# Patient Record
Sex: FEMALE | URBAN - METROPOLITAN AREA
[De-identification: names, ages, dates, MRNs, and addresses within clinical notes are randomized per-mention and may not be internally consistent; named-entity substitution may affect disease eponyms.]

---

## 2023-05-02 ENCOUNTER — HOSPITAL ENCOUNTER (INPATIENT)
Age: 28
LOS: 3 days | Discharge: HOME | End: 2023-05-05
Attending: PSYCHIATRY & NEUROLOGY | Admitting: PSYCHIATRY & NEUROLOGY
Payer: COMMERCIAL

## 2023-05-02 PROBLEM — F31.64 SEVERE MIXED BIPOLAR I DISORDER WITH PSYCHOTIC FEATURES (HCC): Status: ACTIVE | Noted: 2023-05-02

## 2023-05-02 PROBLEM — F32.A DEPRESSION WITH SUICIDAL IDEATION: Status: ACTIVE | Noted: 2023-05-02

## 2023-05-02 PROBLEM — R45.851 DEPRESSION WITH SUICIDAL IDEATION: Status: ACTIVE | Noted: 2023-05-02

## 2023-05-02 PROCEDURE — 6370000000 HC RX 637 (ALT 250 FOR IP)

## 2023-05-02 PROCEDURE — 6370000000 HC RX 637 (ALT 250 FOR IP): Performed by: PSYCHIATRY & NEUROLOGY

## 2023-05-02 PROCEDURE — 1240000000 HC EMOTIONAL WELLNESS R&B

## 2023-05-02 RX ORDER — TRAZODONE HYDROCHLORIDE 50 MG/1
50 TABLET ORAL NIGHTLY PRN
Status: DISCONTINUED | OUTPATIENT
Start: 2023-05-02 | End: 2023-05-05 | Stop reason: HOSPADM

## 2023-05-02 RX ORDER — ARIPIPRAZOLE 5 MG/1
5 TABLET ORAL DAILY
Status: DISCONTINUED | OUTPATIENT
Start: 2023-05-02 | End: 2023-05-02

## 2023-05-02 RX ORDER — POLYETHYLENE GLYCOL 3350 17 G/17G
17 POWDER, FOR SOLUTION ORAL DAILY PRN
Status: DISCONTINUED | OUTPATIENT
Start: 2023-05-02 | End: 2023-05-05 | Stop reason: HOSPADM

## 2023-05-02 RX ORDER — HYDROXYZINE 50 MG/1
50 TABLET, FILM COATED ORAL 3 TIMES DAILY PRN
Status: DISCONTINUED | OUTPATIENT
Start: 2023-05-02 | End: 2023-05-05 | Stop reason: HOSPADM

## 2023-05-02 RX ORDER — ARIPIPRAZOLE 10 MG/1
10 TABLET ORAL DAILY
Status: DISCONTINUED | OUTPATIENT
Start: 2023-05-03 | End: 2023-05-04

## 2023-05-02 RX ORDER — ESCITALOPRAM OXALATE 20 MG/1
20 TABLET ORAL DAILY
Status: ON HOLD | COMMUNITY
End: 2023-05-05 | Stop reason: HOSPADM

## 2023-05-02 RX ORDER — ACETAMINOPHEN 325 MG/1
650 TABLET ORAL EVERY 4 HOURS PRN
Status: DISCONTINUED | OUTPATIENT
Start: 2023-05-02 | End: 2023-05-05 | Stop reason: HOSPADM

## 2023-05-02 RX ORDER — IBUPROFEN 400 MG/1
400 TABLET ORAL EVERY 6 HOURS PRN
Status: DISCONTINUED | OUTPATIENT
Start: 2023-05-02 | End: 2023-05-05 | Stop reason: HOSPADM

## 2023-05-02 RX ORDER — NICOTINE 21 MG/24HR
1 PATCH, TRANSDERMAL 24 HOURS TRANSDERMAL DAILY
Status: DISCONTINUED | OUTPATIENT
Start: 2023-05-02 | End: 2023-05-04

## 2023-05-02 RX ORDER — MAGNESIUM HYDROXIDE/ALUMINUM HYDROXICE/SIMETHICONE 120; 1200; 1200 MG/30ML; MG/30ML; MG/30ML
30 SUSPENSION ORAL EVERY 6 HOURS PRN
Status: DISCONTINUED | OUTPATIENT
Start: 2023-05-02 | End: 2023-05-05 | Stop reason: HOSPADM

## 2023-05-02 RX ADMIN — HYDROXYZINE HYDROCHLORIDE 50 MG: 50 TABLET, FILM COATED ORAL at 03:26

## 2023-05-02 RX ADMIN — HYDROXYZINE HYDROCHLORIDE 50 MG: 50 TABLET, FILM COATED ORAL at 21:25

## 2023-05-02 RX ADMIN — ARIPIPRAZOLE 5 MG: 5 TABLET ORAL at 11:24

## 2023-05-02 RX ADMIN — TRAZODONE HYDROCHLORIDE 50 MG: 50 TABLET ORAL at 21:25

## 2023-05-02 ASSESSMENT — PATIENT HEALTH QUESTIONNAIRE - PHQ9: SUM OF ALL RESPONSES TO PHQ QUESTIONS 1-9: 9

## 2023-05-02 ASSESSMENT — LIFESTYLE VARIABLES
HOW MANY STANDARD DRINKS CONTAINING ALCOHOL DO YOU HAVE ON A TYPICAL DAY: PATIENT DOES NOT DRINK
HOW OFTEN DO YOU HAVE A DRINK CONTAINING ALCOHOL: NEVER

## 2023-05-02 ASSESSMENT — SLEEP AND FATIGUE QUESTIONNAIRES
AVERAGE NUMBER OF SLEEP HOURS: 4
DO YOU HAVE DIFFICULTY SLEEPING: YES
DO YOU USE A SLEEP AID: NO
SLEEP PATTERN: DIFFICULTY FALLING ASLEEP;DISTURBED/INTERRUPTED SLEEP;RESTLESSNESS

## 2023-05-02 NOTE — PLAN OF CARE
Problem: Anxiety  Goal: Will report anxiety at manageable levels  Description: INTERVENTIONS:  1. Administer medication as ordered  2. Teach and rehearse alternative coping skills  3. Provide emotional support with 1:1 interaction with staff  5/2/2023 1601 by Geneva Carbajal LPN  Outcome: Progressing   Reports anxiety that is at [facility] levels. Problem: Depression/Self Harm  Goal: Effect of psychiatric condition will be minimized and patient will be protected from self harm  Description: INTERVENTIONS:  1. Assess impact of patient's symptoms on level of functioning, self care needs and offer support as indicated  2. Assess patient/family knowledge of depression, impact on illness and need for teaching  3. Provide emotional support, presence and reassurance  4. Assess for possible suicidal thoughts or ideation. If patient expresses suicidal thoughts or statements do not leave alone, initiate Suicide Precautions, move to a room close to the nursing station and obtain sitter  5. Initiate consults as appropriate with Mental Health Professional, Spiritual Care, Psychosocial CNS, and consider a recommendation to the LIP for a Psychiatric Consultation  5/2/2023 1601 by Geneva Carbajal LPN  Outcome: Progressing   Miss Shyam Trujillo is seen in the dayroom affect is flat she reports depression with some anxiety. States issues with lose of brother and feeling like she never grieved properly. Started on Medication, which she is willing to take. Reports fair sleep and no issues with appetite.

## 2023-05-02 NOTE — GROUP NOTE
Group Therapy Note    Date: 5/2/2023    Group Start Time: 1330  Group End Time: 8201  Group Topic: Psychoeducation    [facility]    FRANCISCO J Esqueda    Psych-Ed/Relapse Prevention Group Note        Date: April 2, 2023 Start Time: 1:30pm  End Time:  210pm      Number of Participants in Group & Unit Census:  8/15    Topic: psycheducation group     Goal of Group:pt will demonstrate improved concentration and improved cognitive skills       Comments:     Patient did not participate in Psych-Ed/Relapse Prevention group, despite staff encouragement and explanation of benefits. Patient remain seclusive to self. Q15 minute safety checks maintained for patient safety and will continue to encourage patient to attend unit programming.               Signature:  Janes Rodríguez

## 2023-05-02 NOTE — BH NOTE
Patient given tobacco quitline number 83705235018 at this time, refusing to call at this time, states \" I just dont want to quit now\"- patient given information as to the dangers of long term tobacco use. Continue to reinforce the importance of tobacco cessation.

## 2023-05-02 NOTE — GROUP NOTE
Group Therapy Note    Date: 5/2/2023    Group Start Time: 1601  Group End Time: 4518  Group Topic: Cognitive Skills    [facility]    FRANCISCO J Napier        Group Therapy Note    Attendees: 10/16       Patient's Goal:  pt will demonstrate improved coping skills and improved decision making skills     Notes:   pt is pleasant and participated well     Status After Intervention:  Improved    Participation Level:  Active Listener and Interactive    Participation Quality: Appropriate, Sharing, and Supportive      Speech:  normal      Thought Process/Content: Logical      Affective Functioning: Congruent      Mood: euthymic      Level of consciousness:  Alert      Response to Learning: Able to verbalize current knowledge/experience, Able to verbalize/acknowledge new learning, and Progressing to goal      Endings: None Reported    Modes of Intervention: Support, Socialization, and Activity      Discipline Responsible: Psychoeducational Specialist      Signature:  Romie Moulton

## 2023-05-02 NOTE — H&P
Department of Psychiatry  Attending Physician Psychiatric Assessment     Reason for Admission to Psychiatric Unit:  Threat to self requiring 24 hour professional observation  Concerns about patient's safety in the community    CHIEF COMPLAINT: Depression with suicidal ideation and acute psychosis    History obtained from: Patient, electronic medical record          HISTORY OF PRESENT ILLNESS:    Lottie Dobbs is a 34 y.o. female who has a past medical history of depression, anxiety, borderline personality disorder. Patient presented to the ED with suicidal ideation and plan to overdose or drive her car into the water. Patient denies any previous inpatient psychiatric hospitalization. Patient reports stopping medication compliance 6 months ago when she lost her insurance. Patient states at that time she was following with [facility] psychiatric services. Patient states once her insurance restarted she never followed up with mental health services. Patient endorses history of being prescribed Effexor, Zoloft and Lexapro. Patient states she was taking Effexor most recently however it made her sleep disturbances worse. Patient states Zoloft increased suicidal ideation and Lexapro did not work. Patient reports all trials with antidepressants negatively impacted her sleep. Patient is suspected to have bipolar diagnosis, plan to treat with Abilify 5 mg daily and titrate to 10 mg daily as tolerated. When approached for interview patient states she has been feeling very depressed to the point of suicidal ideation. Patient states she has been depression her whole life however it became significantly worse in December 2021 when her brother committed suicide by hanging. Patient states she never received grief counseling. Patient endorses stressors of being lonely and feeling trapped which makes \"dark thoughts overpowering\".   Patient also states she has not been able to work recently because of this which
mg/dL    Est, Glom Filt Rate >60 >60 mL/min/1.73m2    Calcium 9.5 8.6 - 10.4 mg/dL    Sodium 142 135 - 144 mmol/L    Potassium 4.0 3.7 - 5.3 mmol/L    Chloride 104 98 - 107 mmol/L    CO2 27 20 - 31 mmol/L    Anion Gap 11 9 - 17 mmol/L    Alkaline Phosphatase 89 35 - 104 U/L    ALT 42 (H) 5 - 33 U/L    AST 23 <32 U/L    Total Bilirubin 0.5 0.3 - 1.2 mg/dL    Total Protein 7.6 6.4 - 8.3 g/dL    Albumin 4.3 3.5 - 5.2 g/dL   TSH w/reflex to FT4    Collection Time: 05/03/23  7:34 AM   Result Value Ref Range    TSH 1.20 0.30 - 5.00 uIU/mL       Imaging/Diagonstics:  Recent data reviewed    Assessment :      Primary Problem  Severe mixed bipolar I disorder with psychotic features [facility])    Active Hospital Problems    Diagnosis Date Noted    Severe mixed bipolar I disorder with psychotic features [facility]) [F31.64] 05/02/2023       Plan:     Reason for consult: General medical management     Depression with suicidal ideation-management per psychiatry  Morbid obesity BMI 42  Ordering routine labs, vital satisfactory. ALT elevated, will add hep C    DVT prophylaxis-not required, patient is mobile    Consultations:   Edna Guidry MD  5/2/2023  4:18 PM    Copy sent to No primary care provider on file. Please note that this chart was generated using voice recognition Dragon dictation software. Although every effort was made to ensure the accuracy of this automated transcription, some errors in transcription may have occurred.

## 2023-05-02 NOTE — BH NOTE
[facility]  Admission Note     Admission Type:   Admission Type: Voluntary    Reason for admission:  Reason for Admission: Patient went to [facility] ED with suicidal thoughts to over dose on pills or drive car into pond. Patient having difficulty coping with deaths in family and life stressors. Patient states that her depression has worsened, she is not sleeping or eating and very anxious. Addictive Behavior:   Addictive Behavior  In the Past 3 Months, Have You Felt or Has Someone Told You That You Have a Problem With  : None    Medical Problems:   History reviewed. No pertinent past medical history. Status EXAM:  Mental Status and Behavioral Exam  Normal: No  Level of Assistance: Independent/Self  Facial Expression: Sad  Affect: Blunt  Level of Consciousness: Alert  Frequency of Checks: 4 times per hour, close  Mood:Normal: No  Mood: Depressed, Anxious  Motor Activity:Normal: No  Motor Activity: Unusual posture/gait  Eye Contact: Fair  Observed Behavior: Cooperative, Preoccupied  Sexual Misconduct History: Current - no  Preception: [place] to person, [place] to time, [place] to place, [place] to situation  Attention:Normal: No  Attention: Unable to concentrate  Thought Processes: Other (comment) (cooperative, linear)  Thought Content:Normal: No  Thought Content: Poverty of content  Depression Symptoms: Feelings of helplessness, Feelings of hopelessess  Anxiety Symptoms: Generalized  Marie Symptoms: No problems reported or observed.   Hallucinations: None  Delusions: No  Memory:Normal: No  Memory: Poor recent, Poor remote  Insight and Judgment: No  Insight and Judgment: Poor judgment, Poor insight    Tobacco Screening:  Practical Counseling, on admission, graciela X, if applicable and completed (first 3 are required if patient doesn't refuse):            ( ) Recognizing danger situations (included triggers and roadblocks)                    ( ) Coping skills (new ways to manage stress,relaxation

## 2023-05-02 NOTE — PLAN OF CARE
[address] [place] [address]  Initial Interdisciplinary Treatment Plan NO      Original treatment plan Date & Time: 5/2/23 1300    Admission Type:  Admission Type: Voluntary    Reason for admission:   Reason for Admission: Patient went to [facility] ED with suicidal thoughts to over dose on pills or drive car into pond. Patient having difficulty coping with deaths in family and life stressors. Patient states that her depression has worsened, she is not sleeping or eating and very anxious. Estimated Length of Stay:  5-7days  Estimated Discharge Date: to be determined by physician    PATIENT STRENGTHS:  Patient Strengths:   Patient Strengths and Limitations:Limitations: Difficult relationships / poor social skills, Difficulty problem solving/relies on others to help solve problems, Apathetic / unmotivated  Addictive Behavior: Addictive Behavior  In the Past 3 Months, Have You Felt or Has Someone Told You That You Have a Problem With  : None  Medical Problems:History reviewed. No pertinent past medical history. Status EXAM:Mental Status and Behavioral Exam  Normal: No  Level of Assistance: Independent/Self  Facial Expression: Sad  Affect: Blunt  Level of Consciousness: Alert  Frequency of Checks: 4 times per hour, close  Mood:Normal: No  Mood: Depressed, Anxious  Motor Activity:Normal: No  Motor Activity: Unusual posture/gait  Eye Contact: Fair  Observed Behavior: Cooperative, Preoccupied  Sexual Misconduct History: Current - no  Preception: [place] to person, [place] to time, [place] to place, [place] to situation  Attention:Normal: No  Attention: Unable to concentrate  Thought Processes: Other (comment) (cooperative, linear)  Thought Content:Normal: No  Thought Content: Poverty of content  Depression Symptoms: Feelings of helplessness, Feelings of hopelessess  Anxiety Symptoms: Generalized  Marie Symptoms: No problems reported or observed.   Hallucinations: None  Delusions: No  Memory:Normal: No  Memory: Poor recent, Poor

## 2023-05-02 NOTE — CARE COORDINATION
[facility] Biopsychosocial Assessment    Current Level of Psychosocial Functioning     Independent   Dependent   XX   Minimal Assist        Psychosocial High Risk Factors (check all that apply)    Unable to obtain meds   Chronic illness/pain    Substance abuse   Lack of Family Support   Financial stress   Isolation   Inadequate Community Resources XX   Suicide attempt(s) XX   Not taking medications    Victim of crime   Developmental Delay  Unable to manage personal needs    Age 72 or older   Homeless  No transportation   Readmission within 30 days  Unemployment  Traumatic Event      Psychiatric Advanced Directives: denies     Family to Involve in Treatment:  Patient does not identify any family to involve in treatment at time of assessment     Sexual Orientation:  NA    Patient Strengths: Patient has insurance and employment; patient has stable housing; seeking additional supports     Patient Barriers: poor coping skills; grief; not linked with [facility]       Opiate Education Provided:  NA    [facility]/mental health history: Patients first psychiatric admission; patient reports previous lifetime suicide attempts,. Not linked with [facility]   medication management, group/individual therapies, family meetings, psycho -education, treatment team meetings to assist with stabilization    Initial Discharge Plan:  Patient plans to return home and follow up with [facility] in [address] [place] [address]:  Patient is a 34 y.o. female admitted to the [facility] for depression with suicidal ideation. Patient reported a plan to overdose on medications or drive her car into a pond. Patient reports a previous suicide attempt in December where she overdosed on medications. Patient denied previous psychiatric admissions. Patient reports \"feeling like she is drowning in her thoughts\".  Patient does not disclose any major events leading to her increase in depression, however it is noted she is trying to cope with deaths in

## 2023-05-03 LAB
ALBUMIN SERPL-MCNC: 4.3 G/DL (ref 3.5–5.2)
ALP SERPL-CCNC: 89 U/L (ref 35–104)
ALT SERPL-CCNC: 42 U/L (ref 5–33)
ANION GAP SERPL CALCULATED.3IONS-SCNC: 11 MMOL/L (ref 9–17)
AST SERPL-CCNC: 23 U/L
BILIRUB SERPL-MCNC: 0.5 MG/DL (ref 0.3–1.2)
BUN SERPL-MCNC: 13 MG/DL (ref 6–20)
CALCIUM SERPL-MCNC: 9.5 MG/DL (ref 8.6–10.4)
CHLORIDE SERPL-SCNC: 104 MMOL/L (ref 98–107)
CO2 SERPL-SCNC: 27 MMOL/L (ref 20–31)
CREAT SERPL-MCNC: 0.61 MG/DL (ref 0.5–0.9)
GFR SERPL CREATININE-BSD FRML MDRD: >60 ML/MIN/1.73M2
GLUCOSE SERPL-MCNC: 127 MG/DL (ref 70–99)
HCT VFR BLD AUTO: 37.9 % (ref 36–46)
HGB BLD-MCNC: 12.3 G/DL (ref 12–16)
MCH RBC QN AUTO: 27.5 PG (ref 26–34)
MCHC RBC AUTO-ENTMCNC: 32.5 G/DL (ref 31–37)
MCV RBC AUTO: 84.7 FL (ref 80–100)
PDW BLD-RTO: 13.9 % (ref 11.5–14.9)
PLATELET # BLD AUTO: 336 K/UL (ref 150–450)
PMV BLD AUTO: 8.7 FL (ref 6–12)
POTASSIUM SERPL-SCNC: 4 MMOL/L (ref 3.7–5.3)
PROT SERPL-MCNC: 7.6 G/DL (ref 6.4–8.3)
RBC # BLD: 4.48 M/UL (ref 4–5.2)
SODIUM SERPL-SCNC: 142 MMOL/L (ref 135–144)
TSH SERPL-ACNC: 1.2 UIU/ML (ref 0.3–5)
WBC # BLD AUTO: 8.2 K/UL (ref 3.5–11)

## 2023-05-03 PROCEDURE — 6370000000 HC RX 637 (ALT 250 FOR IP): Performed by: PSYCHIATRY & NEUROLOGY

## 2023-05-03 PROCEDURE — 99222 1ST HOSP IP/OBS MODERATE 55: CPT | Performed by: INTERNAL MEDICINE

## 2023-05-03 PROCEDURE — 84443 ASSAY THYROID STIM HORMONE: CPT

## 2023-05-03 PROCEDURE — 36415 COLL VENOUS BLD VENIPUNCTURE: CPT

## 2023-05-03 PROCEDURE — 85027 COMPLETE CBC AUTOMATED: CPT

## 2023-05-03 PROCEDURE — 1240000000 HC EMOTIONAL WELLNESS R&B

## 2023-05-03 PROCEDURE — 80053 COMPREHEN METABOLIC PANEL: CPT

## 2023-05-03 PROCEDURE — 6370000000 HC RX 637 (ALT 250 FOR IP)

## 2023-05-03 RX ADMIN — HYDROXYZINE HYDROCHLORIDE 50 MG: 50 TABLET, FILM COATED ORAL at 10:02

## 2023-05-03 RX ADMIN — HYDROXYZINE HYDROCHLORIDE 50 MG: 50 TABLET, FILM COATED ORAL at 21:46

## 2023-05-03 RX ADMIN — TRAZODONE HYDROCHLORIDE 50 MG: 50 TABLET ORAL at 21:46

## 2023-05-03 RX ADMIN — ARIPIPRAZOLE 10 MG: 10 TABLET ORAL at 08:19

## 2023-05-03 NOTE — PLAN OF CARE
Problem: Anxiety  Goal: Will report anxiety at manageable levels  Description: INTERVENTIONS:  1. Administer medication as ordered  2. Teach and rehearse alternative coping skills  3. Provide emotional support with 1:1 interaction with staff  5/3/2023 0236 by Alyce Colón LPN  Outcome: Progressing  Note: Patient states she is having anxiety yet is managing it. Was offered PRN atarax and accepted. Educated on being able to take atarax up to 3x daily if need be to help with any anxiety. Educated on calming breathing techniques and coping and relaxation methods. Patient was open and accepting of all education. Patient agreed to seek out nursing staff if need be. Will continue to monitor. Q15min safety checks. Problem: Depression/Self Harm  Goal: Effect of psychiatric condition will be minimized and patient will be protected from self harm  Description: INTERVENTIONS:  1. Assess impact of patient's symptoms on level of functioning, self care needs and offer support as indicated  2. Assess patient/family knowledge of depression, impact on illness and need for teaching  3. Provide emotional support, presence and reassurance  4. Assess for possible suicidal thoughts or ideation. If patient expresses suicidal thoughts or statements do not leave alone, initiate Suicide Precautions, move to a room close to the nursing station and obtain sitter  5. Initiate consults as appropriate with Mental Health Professional, Spiritual Care, Psychosocial CNS, and consider a recommendation to the LIP for a Psychiatric Consultation  5/3/2023 0236 by Alyce Colón LPN  Outcome: Progressing  Note: Patient denies thoughts of self harm at this time. Patient agrees to seek out staff if they begin having thoughts of harming self or they need to talk.  Q15min safety checks continue

## 2023-05-03 NOTE — PLAN OF CARE
Problem: Anxiety  Goal: Will report anxiety at manageable levels  Description: INTERVENTIONS:  1. Administer medication as ordered  2. Teach and rehearse alternative coping skills  3. Provide emotional support with 1:1 interaction with staff  5/3/2023 1047 by Billie Vick LPN  Outcome: Progressing  5/3/2023 0236 by Freddy Maharaj LPN  Outcome: Progressing  Note: Patient states she is having anxiety yet is managing it. Was offered PRN atarax and accepted. Educated on being able to take atarax up to 3x daily if need be to help with any anxiety. Educated on calming breathing techniques and coping and relaxation methods. Patient was open and accepting of all education. Patient agreed to seek out nursing staff if need be. Will continue to monitor. Q15min safety checks. Problem: Depression/Self Harm  Goal: Effect of psychiatric condition will be minimized and patient will be protected from self harm  Description: INTERVENTIONS:  1. Assess impact of patient's symptoms on level of functioning, self care needs and offer support as indicated  2. Assess patient/family knowledge of depression, impact on illness and need for teaching  3. Provide emotional support, presence and reassurance  4. Assess for possible suicidal thoughts or ideation. If patient expresses suicidal thoughts or statements do not leave alone, initiate Suicide Precautions, move to a room close to the nursing station and obtain sitter  5. Initiate consults as appropriate with Mental Health Professional, Spiritual Care, Psychosocial CNS, and consider a recommendation to the LIP for a Psychiatric Consultation  5/3/2023 1047 by Billie Vick LPN  Outcome: Progressing  5/3/2023 0236 by Freddy Maharaj LPN  Outcome: Progressing  Note: Patient denies thoughts of self harm at this time. Patient agrees to seek out staff if they begin having thoughts of harming self or they need to talk.  Q15min safety checks continue        At this time patient

## 2023-05-03 NOTE — GROUP NOTE
Group Therapy Note    Date: 5/3/2023    Group Start Time: 0900  Group End Time: 1994  Group Topic: Community Meeting    FIDEL BHBARBARA SHABANA Moncada LPN        Group Therapy Note    Attendees: 8/10       Patient's Goal:  Learning how to cope with family death that has occurred and family. Status After Intervention:  Improved    Participation Level:  Active Listener    Participation Quality: Sharing      Speech:  normal      Thought Process/Content: Logical      Affective Functioning: Flat      Mood: depressed      Level of consciousness:  Alert      Response to Learning: Able to verbalize current knowledge/experience      Endings: None Reported    Modes of Intervention: Support      Discipline Responsible: Licensed Practical Nurse      Signature:  Yazmin Moncada LPN

## 2023-05-03 NOTE — GROUP NOTE
Group Therapy Note    Date: 5/3/2023    Group Start Time: 1100  Group End Time: 1210  Group Topic: Music Therapy    LISA BHBARBARA D    Gricelda Nafisa    Music Therapy Group Note        Date: 5/3/2023   Start Time: 1100 End Time: 1210      Number of Participants in Group & Unit Census:  9/15    Topic: Patients shared music and analyzed lyrics, sharing general advice based on the topics they analyzed. Goal of Group: Goals to increase sense of motivation; Engage peer support; Increase socialization; Increase self-expression      Comments:     Patient did not participate in Music Therapy group, despite staff encouragement and explanation of benefits. Patient remain seclusive to self. Q15 minute safety checks maintained for patient safety and will continue to encourage patient to attend unit programming.

## 2023-05-04 LAB — HCV AB SER QL: NONREACTIVE

## 2023-05-04 PROCEDURE — 86803 HEPATITIS C AB TEST: CPT

## 2023-05-04 PROCEDURE — 6370000000 HC RX 637 (ALT 250 FOR IP): Performed by: PSYCHIATRY & NEUROLOGY

## 2023-05-04 PROCEDURE — 99232 SBSQ HOSP IP/OBS MODERATE 35: CPT | Performed by: INTERNAL MEDICINE

## 2023-05-04 PROCEDURE — 36415 COLL VENOUS BLD VENIPUNCTURE: CPT

## 2023-05-04 PROCEDURE — 6370000000 HC RX 637 (ALT 250 FOR IP): Performed by: INTERNAL MEDICINE

## 2023-05-04 PROCEDURE — 1240000000 HC EMOTIONAL WELLNESS R&B

## 2023-05-04 PROCEDURE — 6370000000 HC RX 637 (ALT 250 FOR IP)

## 2023-05-04 RX ORDER — AMLODIPINE BESYLATE 5 MG/1
5 TABLET ORAL DAILY
Status: DISCONTINUED | OUTPATIENT
Start: 2023-05-04 | End: 2023-05-05 | Stop reason: HOSPADM

## 2023-05-04 RX ORDER — ARIPIPRAZOLE 15 MG/1
15 TABLET ORAL DAILY
Status: DISCONTINUED | OUTPATIENT
Start: 2023-05-05 | End: 2023-05-05 | Stop reason: HOSPADM

## 2023-05-04 RX ADMIN — TRAZODONE HYDROCHLORIDE 50 MG: 50 TABLET ORAL at 22:31

## 2023-05-04 RX ADMIN — ARIPIPRAZOLE 10 MG: 10 TABLET ORAL at 08:26

## 2023-05-04 RX ADMIN — IBUPROFEN 400 MG: 400 TABLET, FILM COATED ORAL at 17:27

## 2023-05-04 RX ADMIN — HYDROXYZINE HYDROCHLORIDE 50 MG: 50 TABLET, FILM COATED ORAL at 22:31

## 2023-05-04 RX ADMIN — AMLODIPINE BESYLATE 5 MG: 5 TABLET ORAL at 12:29

## 2023-05-04 RX ADMIN — IBUPROFEN 400 MG: 400 TABLET, FILM COATED ORAL at 08:26

## 2023-05-04 ASSESSMENT — PAIN SCALES - GENERAL
PAINLEVEL_OUTOF10: 5
PAINLEVEL_OUTOF10: 0

## 2023-05-04 ASSESSMENT — PAIN - FUNCTIONAL ASSESSMENT
PAIN_FUNCTIONAL_ASSESSMENT: ACTIVITIES ARE NOT PREVENTED
PAIN_FUNCTIONAL_ASSESSMENT: ACTIVITIES ARE NOT PREVENTED

## 2023-05-04 ASSESSMENT — PAIN DESCRIPTION - LOCATION
LOCATION: HEAD
LOCATION: TEETH

## 2023-05-04 ASSESSMENT — PAIN DESCRIPTION - DESCRIPTORS
DESCRIPTORS: ACHING
DESCRIPTORS: ACHING

## 2023-05-04 ASSESSMENT — PAIN DESCRIPTION - ORIENTATION
ORIENTATION: LEFT
ORIENTATION: MID

## 2023-05-04 NOTE — PLAN OF CARE
[facility]  Day 3 Interdisciplinary Treatment Plan NOTE    Review Date & Time: 5/4/2023 1300    Admission Type:   Admission Type: Voluntary    Reason for admission:  Reason for Admission: Patient went to [facility] ED with suicidal thoughts to over dose on pills or drive car into pond. Patient having difficulty coping with deaths in family and life stressors. Patient states that her depression has worsened, she is not sleeping or eating and very anxious. Estimated Length of Stay:  5-7 days  Estimated Discharge Date Update:   to be determined by physician    PATIENT STRENGTHS:  Patient Strengths:   Patient Strengths and Limitations:Limitations: Difficult relationships / poor social skills, Difficulty problem solving/relies on others to help solve problems, Apathetic / unmotivated  Addictive Behavior:Addictive Behavior  In the Past 3 Months, Have You Felt or Has Someone Told You That You Have a Problem With  : None  Medical Problems:History reviewed. No pertinent past medical history. Risk:  Fall Risk   Alverto Scale Alverto Scale Score: 22  BVC    Change in scores:  No Changes to plan of Care:  No    Status EXAM:   Mental Status and Behavioral Exam  Normal: Yes  Level of Assistance: Independent/Self  Facial Expression: Brightened  Affect: Appropriate  Level of Consciousness: Alert  Frequency of Checks: 4 times per hour, close  Mood:Normal: No  Mood: Euphoric  Motor Activity:Normal: Yes  Motor Activity: Unusual posture/gait  Eye Contact: Good  Observed Behavior: Cooperative, Friendly  Sexual Misconduct History: Current - no  Preception: [place] to person, [place] to time, [place] to place, [place] to situation  Attention:Normal: Yes  Attention: Distractible  Thought Processes: Unremarkable  Thought Content:Normal: Yes  Thought Content: Preoccupations  Depression Symptoms: No problems reported or observed. Anxiety Symptoms: No problems reported or observed.   Marie Symptoms: No problems reported or

## 2023-05-04 NOTE — PLAN OF CARE
Problem: Anxiety  Goal: Will report anxiety at manageable levels  Description: INTERVENTIONS:  1. Administer medication as ordered  2. Teach and rehearse alternative coping skills  3. Provide emotional support with 1:1 interaction with staff  5/4/2023 1314 by Nelly Part  Outcome: Progressing  Pt relates she continues to have some anxiety but feels she is doing better today. Pt is social, attending groups. Showered. Out for meals. Medication compliant. Problem: Depression/Self Harm  Goal: Effect of psychiatric condition will be minimized and patient will be protected from self harm  Description: INTERVENTIONS:  1. Assess impact of patient's symptoms on level of functioning, self care needs and offer support as indicated  2. Assess patient/family knowledge of depression, impact on illness and need for teaching  3. Provide emotional support, presence and reassurance  4. Assess for possible suicidal thoughts or ideation. If patient expresses suicidal thoughts or statements do not leave alone, initiate Suicide Precautions, move to a room close to the nursing station and obtain sitter  5. Initiate consults as appropriate with Mental Health Professional, Spiritual Care, Psychosocial CNS, and consider a recommendation to the LIP for a Psychiatric Consultation  5/4/2023 1314 by Nelly Part  Outcome: Progressing  Pt. Relates to feeling depressed and anxious. Pt det goal to work on the next step toward discharge. Denies suicidal thoughts. Denies hallucinations. Pt. Remains safe on the unit. Q 15 minute checks for safety maintained.

## 2023-05-04 NOTE — GROUP NOTE
Group Therapy Note    Date: 5/4/2023    Group Start Time: 1330  Group End Time: 2207  Group Topic: Recreational    FRANCISCO J Robertson    Recreation Group Note        Date: May 4, 2023 Start Time: 1:30pm  End Time:  215pm      Number of Participants in Group & Unit Census:  8/15    Topic: recreation group    Goal of Group:pt will demonstrate improved leisure awareness and improved social skills       Comments:     Patient did not participate in Recreation group, despite staff encouragement and explanation of benefits. Patient remain seclusive to self. Q15 minute safety checks maintained for patient safety and will continue to encourage patient to attend unit programming.               Signature:  Julia Sensor

## 2023-05-04 NOTE — PLAN OF CARE
Problem: Anxiety  Goal: Will report anxiety at manageable levels  Description: INTERVENTIONS:  1. Administer medication as ordered  2. Teach and rehearse alternative coping skills  3. Provide emotional support with 1:1 interaction with staff  5/4/2023 0047 by Yessica Hayes LPN  Outcome: Progressing  Note: Patient admits to some anxiety this shift. Patient out and selectively social, and on the phone this shift. Patient pleasant and cooperative with assessments this shift. Patient denies suicidal/homicidal ideations and hallucinations this shift. Patient monitored every 15 minutes with environmental safety checks. Problem: Depression/Self Harm  Goal: Effect of psychiatric condition will be minimized and patient will be protected from self harm  Description: INTERVENTIONS:  1. Assess impact of patient's symptoms on level of functioning, self care needs and offer support as indicated  2. Assess patient/family knowledge of depression, impact on illness and need for teaching  3. Provide emotional support, presence and reassurance  4. Assess for possible suicidal thoughts or ideation. If patient expresses suicidal thoughts or statements do not leave alone, initiate Suicide Precautions, move to a room close to the nursing station and obtain sitter  5. Initiate consults as appropriate with Mental Health Professional, Spiritual Care, Psychosocial CNS, and consider a recommendation to the LIP for a Psychiatric Consultation  5/4/2023 0047 by Yessica Hayes LPN  Outcome: Progressing  Note: Patient admits to depression this shift. Patient is free of self harm at this time. Patient agrees to seek out staff if thoughts to harm self arise. Staff will provide support and reassurance as needed. Safety checks maintained every 15 minutes.

## 2023-05-04 NOTE — GROUP NOTE
Group Therapy Note    Date: 5/4/2023    Group Start Time: 7659  Group End Time: 1130  Group Topic: Cognitive Skills    FRANCISCO J Whitney    Cognitive Skills Group Note        Date: May 4, 2023 Start Time:  1045am   End Time: 11:30am      Number of Participants in Group & Unit Census:  7/16    Topic: cognitive skills     Goal of Group: pt will demonstrate improved concentration and improved social skills       Comments:     Patient did not participate in Cognitive Skills group, despite staff encouragement and explanation of benefits. Patient remain seclusive to self. Q15 minute safety checks maintained for patient safety and will continue to encourage patient to attend unit programming.             Signature:  Alissa Hubbard

## 2023-05-04 NOTE — GROUP NOTE
Group Therapy Note    Date: 5/4/2023    Group Start Time: 0900  Group End Time: 8415  Group Topic: Community Meeting    LISA Abraham        Group Therapy Note    Attendees: 10/16       Patient's Goal:  Talk about discharge with doctor    Notes:  controlled    Status After Intervention:  Unchanged    Participation Level: Active Listener    Participation Quality: Appropriate      Speech:  normal      Thought Process/Content: Logical      Affective Functioning: Congruent      Mood: anxious      Level of consciousness:  Oriented x4      Response to Learning: Able to verbalize current knowledge/experience and Progressing to goal      Endings: None Reported    Modes of Intervention: Education, Support, and Socialization      Discipline Responsible: Behavorial Health Tech      Signature:   Narciso Abraham

## 2023-05-05 VITALS
WEIGHT: 293 LBS | RESPIRATION RATE: 15 BRPM | TEMPERATURE: 98 F | HEART RATE: 92 BPM | HEIGHT: 71 IN | SYSTOLIC BLOOD PRESSURE: 134 MMHG | DIASTOLIC BLOOD PRESSURE: 89 MMHG | BODY MASS INDEX: 41.02 KG/M2

## 2023-05-05 PROCEDURE — 6370000000 HC RX 637 (ALT 250 FOR IP)

## 2023-05-05 PROCEDURE — 6370000000 HC RX 637 (ALT 250 FOR IP): Performed by: INTERNAL MEDICINE

## 2023-05-05 RX ORDER — TRAZODONE HYDROCHLORIDE 50 MG/1
50 TABLET ORAL NIGHTLY PRN
Qty: 30 TABLET | Refills: 0 | Status: SHIPPED | OUTPATIENT
Start: 2023-05-05

## 2023-05-05 RX ORDER — HYDROXYZINE 50 MG/1
50 TABLET, FILM COATED ORAL 3 TIMES DAILY PRN
Qty: 30 TABLET | Refills: 0 | Status: SHIPPED | OUTPATIENT
Start: 2023-05-05 | End: 2023-05-15

## 2023-05-05 RX ORDER — ARIPIPRAZOLE 15 MG/1
15 TABLET ORAL DAILY
Qty: 30 TABLET | Refills: 0 | Status: SHIPPED | OUTPATIENT
Start: 2023-05-06

## 2023-05-05 RX ORDER — AMLODIPINE BESYLATE 5 MG/1
5 TABLET ORAL DAILY
Qty: 30 TABLET | Refills: 0 | Status: SHIPPED | OUTPATIENT
Start: 2023-05-06

## 2023-05-05 RX ADMIN — ARIPIPRAZOLE 15 MG: 15 TABLET ORAL at 08:58

## 2023-05-05 RX ADMIN — AMLODIPINE BESYLATE 5 MG: 5 TABLET ORAL at 08:57

## 2023-05-05 NOTE — PROGRESS NOTES
05/03/23 1424   Encounter Summary   Encounter Overview/Reason  Spiritual/Emotional Needs   Service Provided For: Patient   Referral/Consult From: Thomas   Last Encounter  05/03/23   Complexity of Encounter Moderate   Begin Time 0130   End Time  0200   Total Time Calculated 30 min   Spiritual/Emotional needs   Type Spiritual Support   Behavioral Health    Type  [facility]   Assessment/Intervention/Outcome   Assessment Calm   Intervention Active listening;Nurtured Hope;Prayer (assurance of)/[place];Sustaining Presence/Ministry of presence   Outcome Receptive; Expressed Gratitude;Engaged in conversation
Behavioral Services  Medicare Certification Upon Admission    I certify that this patient's inpatient psychiatric hospital admission is medically necessary for:    [x] (1) Treatment which could reasonably be expected to improve this patient's condition,       [x] (2) Or for diagnostic study;     AND     [x](2) The inpatient psychiatric services are provided while the individual is under the care of a physician and are included in the individualized plan of care.     Estimated length of stay/service 2-9 days    Plan for post-hospital care -outpatient care    Electronically signed by Alma Giles MD on 5/2/2023 at 9:20 AM
CLINICAL PHARMACY NOTE: MEDS TO BEDS    Total # of Prescriptions Filled: 4   The following medications were delivered to the patient:  Trazodone HCL 50mg  Hydroxyzine HCL 50mg  Amlodipine 5mg  Trazodone HCL 50mg  Aripiprazole 15mg    Additional Documentation:  Delivered medications to Kati Kirk at nurses station 12:19pm 5/5/23 DP
Daily Progress Note  5/4/2023    Patient Name: Terrance Berger    CHIEF COMPLAINT: Acute psychosis with depression and suicidal ideation         SUBJECTIVE:      Patient is seen today for a follow up assessment. When approached interview patient states she is feeling okay today and reports improvement in depression and suicidal ideation. Patient is denying auditory and visual hallucinations and reports no symptoms of lm. Patient reports tolerating Abilify well and states she is she has noticed improvement in her mood stability. Plan discussed about further titration of Abilify to 15 mg daily patient is agreeable. Patient reports a plan to continue following up outpatient for mental health treatment. Patient states she is able to maintain safety at home and has support system that she did not realize she had previously. Patient also reports improvement in insight with medication compliance, recognizing multiple times when she stopped medication after she feels good. It was discussed with patient about feelings of shame surrounding medication and she agrees to support with medication is not reasons for shame. Potential discharge in 1 to 2 days pending continued improvement and stability, we will reassess tomorrow and discharge as indicated. Appetite:  [] Adequate/Unchanged  [x] Increased  [] Decreased      Sleep:       [] Adequate/Unchanged  [x] Fair  [] Poor      Group Attendance on Unit:   [] Yes   [x] Selectively    [] No    Compliant with scheduled medications: [x] Yes  [] No    Received emergency medications in past 24 hrs: [] Yes   [x] No    Medication Side Effects: Denies         Mental Status Exam  Level of consciousness: Alert and awake. Appearance: Appropriate attire for setting, seated in chair, with fair  grooming and hygiene. Behavior/Motor: Approachable, compliant with interview  Attitude toward examiner: Cooperative, attentive, good eye contact.   Speech: normal rate and normal
Pharmacy Med Education Group Note    Date: 05/03/23  Start Time: 1430  End Time: 2630    Number Participants in Group:  8    Goal:  Patient will demonstrate an understanding of the medications intended purpose and possible adverse effects  Topic: [place] for Pharmacy Med Ed Group    Discipline Responsible:     OT  AT  [facility].  RT     X Other       Participation Level:     None  Minimal      X Active Listener    X Interactive    Monopolizing         Participation Quality:    X Appropriate  Inappropriate     X       Attentive        Intrusive          Sharing        Resistant          Supportive        Lethargic       Affective:     X Congruent  Incongruent  Blunted  Flat    Constricted  Anxious  Elated  Angry    Labile  Depressed  Other         Cognitive:    X Alert  Oriented PPTP     Concentration   X G  F  P   Attention Span   X G  F  P   Short-Term Memory   X G  F  P   Long-Term Memory  G  F  P   ProblemSolving/  Decision Making  G  F  P   Ability to Process  Information   X G  F  P      Contributing Factors             Delusional             Hallucinating             Flight of Ideas             Other:       Modes of Intervention:    X Education   X Support  Exploration    Clarifying  Problem Solving  Confrontation    Socialization  Limit Setting  Reality Testing    Activity  Movement  Media    Other:            Response to Learning:    X Able to verbalize current knowledge/experience    Able to verbalize/acknowledge new learning    Able to retain information    Capable of insight    Able to change behavior    Progressing to goal    Other:        Comments:     Korin Courtney, PharmD, BCPS, BCPP  5/3/2023 3:09 PM  [phone]
Pharmacy Medication History Note      List of current medications patient is taking is complete. Source of information: [employer] ([employer]eryle Siren), [place], [place]    Changes made to medication list:  Medications removed (include reason, ex. therapy complete or physician discontinued, noncompliance):  none    Medications added/doses adjusted: Added Escitalopram 20 mg daily      Current Home Medication List at Time of Admission:  Prior to Admission medications    Medication Sig   escitalopram (LEXAPRO) 20 MG tablet Take 1 tablet by mouth daily         Please let me know if you have any questions about this encounter. Thank you!     Electronically signed by Asaf Woo [facility] on 5/2/2023 at 10:33 AM
RT ASSESSMENT TREATMENT GOALS    [x]Pt Goal:  Pt will identify 1-2 positive coping skills by time of discharge. [x]Pt Goal:  Pt will identify 1-2 positive aspects of self by time of discharge. []Pt Goal:  Pt will remain on task/topic for 15-30 minutes during group by time of discharge. []Pt Goal:  Pt will identify 1-2 aspects of relapse prevention plan by time of discharge. []Pt Goal:  Pt will join in conversation with peers 1-2 times per group by time of discharge. []Pt Goal:  Pt will identify 1-2 new leisure interests by time of discharge. []Pt Goal:  Pt will not voice any delusional content by time of discharge.
153/58   Pulse 91   Temp 97.7 °F (36.5 °C) (Temporal)   Resp 14   Ht 5' 11\" (1.803 m)   Wt (!) 305 lb (138.3 kg)   BMI 42.54 kg/m²   Temp (24hrs), Av.7 °F (36.5 °C), Min:97.7 °F (36.5 °C), Max:97.7 °F (36.5 °C)    No results for input(s): POCGLU in the last 72 hours. No intake or output data in the 24 hours ending 23 1216    General Appearance:  alert, well appearing, and in no acute distress  Head:  normocephalic, atraumatic. Eye: no icterus, redness, pupils equal and reactive, extraocular eye movements intact, conjunctiva clear  Ear: normal external ear, no discharge, hearing intact  Nose:  no drainage noted  Mouth: mucous membranes moist  Neck: supple, no carotid bruits, thyroid not palpable  Lungs: Bilateral equal air entry, clear to ausculation, no wheezing, rales or rhonchi, normal effort  Cardiovascular: normal rate, regular rhythm, no murmur, gallop, rub. Abdomen: Soft, nontender, nondistended, normal bowel sounds, no hepatomegaly or splenomegaly  Neurologic: There are no new focal motor or sensory deficits, normal muscle tone and bulk, no abnormal sensation, normal speech, cranial nerves II through XII grossly intact  Skin: No gross lesions, rashes, bruising or bleeding on exposed skin area  Extremities:  No joint swelling, no pedal edema or calf pain with palpation      Investigations:      Laboratory Testing:  No results found for this or any previous visit (from the past 24 hour(s)). Imaging/Diagonstics:  Recent data reviewed    Assessment :      Primary Problem  Severe mixed bipolar I disorder with psychotic features [facility])    Active Hospital Problems    Diagnosis Date Noted    Severe mixed bipolar I disorder with psychotic features [facility]) [F31.64] 2023       Plan:     Reason for consult: General medical management     Depression with suicidal ideation-management per psychiatry  Morbid obesity BMI 42  Ordering routine labs, vital satisfactory.   ALT elevated, will add hep
Reports improvement in suicidal ideations, without current plan, denies intent to harm self on unit. Unable to contract for safety off unit. Denies homicidal ideations               Denies hallucinations              Denies delusions              Denies paranoia  Cognition: Oriented to person, place, time and situation. Memory: Intact. Insight: Poor. Judgement: Poor. Data   height is 5' 11\" (1.803 m) and weight is 305 lb (138.3 kg) (abnormal). Her temporal temperature is 97.7 °F (36.5 °C). Her blood pressure is 174/94 (abnormal) and her pulse is 97. Her respiration is 14. Labs:   Admission on 05/02/2023   Component Date Value Ref Range Status    WBC 05/03/2023 8.2  3.5 - 11.0 k/uL Final    RBC 05/03/2023 4.48  4.0 - 5.2 m/uL Final    Hemoglobin 05/03/2023 12.3  12.0 - 16.0 g/dL Final    Hematocrit 05/03/2023 37.9  36 - 46 % Final    MCV 05/03/2023 84.7  80 - 100 fL Final    MCH 05/03/2023 27.5  26 - 34 pg Final    MCHC 05/03/2023 32.5  31 - 37 g/dL Final    RDW 05/03/2023 13.9  11.5 - 14.9 % Final    Platelets 30/04/1445 336  150 - 450 k/uL Final    MPV 05/03/2023 8.7  6.0 - 12.0 fL Final    Glucose 05/03/2023 127 (H)  70 - 99 mg/dL Final    BUN 05/03/2023 13  6 - 20 mg/dL Final    Creatinine 05/03/2023 0.61  0.50 - 0.90 mg/dL Final    Est, Glom Filt Rate 05/03/2023 >60  >60 mL/min/1.73m2 Final    Comment:       These results are not intended for use in patients <25years of age. eGFR results are calculated without a race factor using the 2021 CKD-EPI equation. Careful clinical correlation is recommended, particularly when comparing to results   calculated using previous equations. The CKD-EPI equation is less accurate in patients with extremes of muscle mass, extra-renal   metabolism of creatine, excessive creatine ingestion, or following therapy that affects   renal tubular secretion.       Calcium 05/03/2023 9.5  8.6 - 10.4 mg/dL Final    Sodium 05/03/2023 142  135 - 144 mmol/L

## 2023-05-05 NOTE — PLAN OF CARE
Problem: Anxiety  Goal: Will report anxiety at manageable levels  Description: INTERVENTIONS:  1. Administer medication as ordered  2. Teach and rehearse alternative coping skills  3. Provide emotional support with 1:1 interaction with staff  5/4/2023 2250 by Chelsea Angel LPN  Outcome: Progressing  Patient appears brightened upon approach and seen out in dayroom socializing with peers. Patient reports improvement in mood and rates her anxiety a 4/10 at this time. Patient requests as needed anti-anxiety medication at bedtime, reports effective results. Problem: Depression/Self Harm  Goal: Effect of psychiatric condition will be minimized and patient will be protected from self harm  Description: INTERVENTIONS:  1. Assess impact of patient's symptoms on level of functioning, self care needs and offer support as indicated  2. Assess patient/family knowledge of depression, impact on illness and need for teaching  3. Provide emotional support, presence and reassurance  4. Assess for possible suicidal thoughts or ideation. If patient expresses suicidal thoughts or statements do not leave alone, initiate Suicide Precautions, move to a room close to the nursing station and obtain sitter  5. Initiate consults as appropriate with Mental Health Professional, Spiritual Care, Psychosocial CNS, and consider a recommendation to the LIP for a Psychiatric Consultation  5/4/2023 2250 by Chelsea Angel LPN  Outcome: Progressing  Patient denies suicidal ideations at this time. Patient safety checks are maintained every 15 minutes.

## 2023-05-05 NOTE — CARE COORDINATION
Name: Jorge Condon    : 1995    Discharge Date: 2023    Primary Auth/Cert #: [id number]    Destination: home    Discharge Medications:      Medication List        START taking these medications      amLODIPine 5 MG tablet  Commonly known as: NORVASC  Take 1 tablet by mouth daily  Start taking on: May 6, 2023  Notes to patient: Blood pressure     ARIPiprazole 15 MG tablet  Commonly known as: ABILIFY  Take 1 tablet by mouth daily  Start taking on: May 6, 2023  Notes to patient: Mood/clear thoughts     hydrOXYzine HCl 50 MG tablet  Commonly known as: ATARAX  Take 1 tablet by mouth 3 times daily as needed for Anxiety  Notes to patient: anxiety     traZODone 50 MG tablet  Commonly known as: DESYREL  Take 1 tablet by mouth nightly as needed for Sleep  Notes to patient: Sleep aid            STOP taking these medications      Lexapro 20 MG tablet  Generic drug: escitalopram               Where to Get Your Medications        [facility] [address] [address] [facility] [address]      Phone: [phone]   amLODIPine 5 MG tablet  ARIPiprazole 15 MG tablet  hydrOXYzine HCl 50 MG tablet  traZODone 50 MG tablet         Follow Up Appointment: [address] [address] [address] [id number]  [phone]  Go on 5/10/2023  You have an appointment at 1:30pm for intake    [place] [address]  Ph: [phone]  Fax: [phone]  Go on 2023  You have an appointment at 12:15pm for the Med Clinic

## 2023-05-05 NOTE — DISCHARGE INSTRUCTIONS
Information:  Medications:   Medication summary provided   I understand that I should take only the medications on my list.     -why and when I need to take each medicine.     -which side effects to watch for.     -that I should carry my medication information at all times in case of     Emergency situations. I will take all of my medicines to follow up appointments.     -check with my physician or pharmacist before taking any new    Medication, over the counter product or drink alcohol.    -Ask about food, drug or dietary supplement interactions.    -discard old lists and update records with medication providers. Notify Physician:  Notify physician if you notice:   Always call 911 if you feel your life is in danger  In case of an emergency call 911 immediately! If 911 is not available call your local emergency medical system for help    Behavioral Health Follow Up:  Original Referral Source: [facility] ED  Discharge Diagnosis: Depression with suicidal ideation [F32. A, R45.851]  Recommendations for Level of Care: Take medication as prescribed and keep all scheduled appointments  Patient status at discharge: In control, denies any suicidal/homicidal ideations  My hospital  was: [facility]  Aftercare plan faxed: [facility]   -faxed by: nurse   -date: 5-5-23   -time: 126pm  Prescriptions: filled and sent from [address] [place] [address]    Smoking: Quit Smoking. Call the [employer]'s smoking quitline at 5-763-97I-QUIT  Know the signs of a heart attack   If you have any of the following symptoms call 911 immediately, do not wait more    Than five minutes. 1. Pressure, fullness and/ or squeezing in the center of the chest spreading to    The jaw, neck or shoulder. 2. Chest discomfort with light headedness, fainting, sweating, nausea or    Shortness of breath. 3. Upper abdominal pressure or discomfort.    4. Lower chest pain, back pain, unusual fatigue, shortness of breath, nausea   Or

## 2023-05-05 NOTE — BH NOTE
Patient given tobacco quitline number [phone] at this time, refusing to call at this time, states \" I just dont want to quit now\"- patient given information as to the dangers of long term tobacco use. Continue to reinforce the importance of tobacco cessation.

## 2023-05-05 NOTE — GROUP NOTE
Group Therapy Note    Date: 5/5/2023    Group Start Time: 1007  Group End Time: 1130  Group Topic: Relaxation    [facility]    FRANCISCO J Castro        Group Therapy Note    Attendees: 9/18       Patient's Goal:  pt will identify benefits of using art for relaxation and coping     Notes:  pt was pleasant and participated well     Status After Intervention:  Improved    Participation Level:  Active Listener and Interactive    Participation Quality: Appropriate, Sharing, and Supportive      Speech:  normal      Thought Process/Content: Logical      Affective Functioning: Congruent      Mood: euthymic      Level of consciousness:  Alert      Response to Learning: Able to verbalize current knowledge/experience, Able to verbalize/acknowledge new learning, and Progressing to goal      Endings: None Reported    Modes of Intervention: Support, Socialization, and Activity      Discipline Responsible: Psychoeducational Specialist      Signature:  Tatiana Storey

## 2023-05-05 NOTE — DISCHARGE SUMMARY
Provider Discharge Summary     Patient ID:  Davis Hawley  [id number]  42 y.o.  1995    Admit date: 5/2/2023    Discharge date and time: 5/5/2023  10:38 AM     Admitting Physician: Carrie Fonseca MD     Discharge Physician: Corinna Cervantes MD    Admission Diagnoses: Depression with suicidal ideation [F32. A, R45.851]    Discharge Diagnoses:     Severe mixed bipolar I disorder with psychotic features [facility])     Patient Active Problem List   Diagnosis Code    Depression with suicidal ideation F32. A, R45.851    [facility] F31.64        Admission Condition: poor    Discharged Condition: stable    Indication for Admission: threat to self    History of Present Illnes (present tense wording is of findings from admission exam and are not necessarily indicative of current findings):   Davis Hawley is a 34 y.o. female who has a past medical history of depression, anxiety, borderline personality disorder. Patient presented to the ED with suicidal ideation and plan to overdose or drive her car into the water. Patient denies any previous inpatient psychiatric hospitalization. Patient reports stopping medication compliance 6 months ago when she lost her insurance. Patient states at that time she was following with [facility] psychiatric services. Patient states once her insurance restarted she never followed up with mental health services. Patient endorses history of being prescribed Effexor, Zoloft and Lexapro. Patient states she was taking Effexor most recently however it made her sleep disturbances worse. Patient states Zoloft increased suicidal ideation and Lexapro did not work. Patient reports all trials with antidepressants negatively impacted her sleep. Patient is suspected to have bipolar diagnosis, plan to treat with Abilify 5 mg daily and titrate to 10 mg daily as tolerated.      When approached for interview patient states she has been feeling very

## 2023-05-05 NOTE — GROUP NOTE
Group Therapy Note    Date: 5/5/2023    Group Start Time: 0910  Group End Time: 7145  Group Topic: Group Documentation    [facility]    Nga Gardner LPN        Group Therapy Note    Attendees: 9/18       Patient's Goal:  educate    Notes:  inspire    Status After Intervention:  Improved    Participation Level: Interactive    Participation Quality: Appropriate      Speech:  normal      Thought Process/Content: Logical      Affective Functioning: Congruent      Mood: depressed      Level of consciousness:  Alert      Response to Learning: Progressing to goal      Endings: None Reported    Modes of Intervention: Education      Discipline Responsible: Licensed Practical Nurse      Signature:  Nga Gardner LPN

## 2023-05-05 NOTE — BH NOTE
[facility]  Discharge Note     Pt belongings: Retrieved from room/safe, reviewed and packed to take with. Dental Appliances: None  Vision - Corrective Lenses: None  Hearing Aid: None  Jewelry: Body Piercing, Earrings  Body Piercings Removed: Yes  Clothing: Footwear, Pants, Shirt, Undergarments  Other Valuables: Other (Comment) (none)       Patient discharged to residence via cab. Instructed on discharge instructions, pt verbalizes understanding and signs AVS, AVS faxed. Pt in control at time of discharge. Pt amb[facility] to [facility] with psych staff x2. Rx filled and sent from [facility]. No complaints voiced at this time. Status EXAM upon discharge:  Mental Status and Behavioral Exam  Normal: Yes  Level of Assistance: Independent/Self  Facial Expression: Brightened  Affect: Appropriate  Level of Consciousness: Alert  Frequency of Checks: 4 times per hour, close  Mood:Normal: Yes  Mood: Anxious  Motor Activity:Normal: Yes  Motor Activity: Increased  Eye Contact: Good  Observed Behavior: Cooperative  Sexual Misconduct History: Current - no  Preception: Solon to person, Solon to time, [place] to place  Attention:Normal: Yes  Attention: Distractible  Thought Processes: Other (comment) (linear)  Thought Content:Normal: Yes  Thought Content: Other (comment) (logical)  Depression Symptoms: No problems reported or observed. Anxiety Symptoms: Generalized  Marie Symptoms: No problems reported or observed.   Hallucinations: None  Delusions: No  Memory:Normal: Yes  Memory: Poor recent  Insight and Judgment: Yes  Insight and Judgment: Poor insight    George Vences LPN

## 2023-05-05 NOTE — GROUP NOTE
Group Therapy Note    Date: 5/5/2023    Group Start Time: 1330  Group End Time: 9119  Group Topic: cognitive skills     [facility]    FRANCISCO J Moreau        Group Therapy Note    Attendees: 11/17       Patient's Goal:  pt will demonstrate improved coping skills , communication skills, and improved listening     Notes:  pt was pleasant and participated well     Status After Intervention:  Improved    Participation Level:  Active Listener and Interactive    Participation Quality: Appropriate, Sharing, and Supportive      Speech:  normal      Thought Process/Content: Logical      Affective Functioning: Congruent      Mood: euthymic      Level of consciousness:  Alert      Response to Learning: Able to verbalize current knowledge/experience, Able to verbalize/acknowledge new learning, and Progressing to goal      Endings: None Reported    Modes of Intervention: Support, Socialization, and Activity      Discipline Responsible: Psychoeducational Specialist      Signature:  Saul De La Torre

## 2023-05-17 LAB
EKG ATRIAL RATE: 70 BPM
EKG P AXIS: 18 DEGREES
EKG P-R INTERVAL: 164 MS
EKG Q-T INTERVAL: 396 MS
EKG QRS DURATION: 86 MS
EKG QTC CALCULATION (BAZETT): 427 MS
EKG R AXIS: 78 DEGREES
EKG T AXIS: 36 DEGREES
EKG VENTRICULAR RATE: 70 BPM

## 2023-10-11 ENCOUNTER — HOSPITAL ENCOUNTER (OUTPATIENT)
Age: 28
Setting detail: SPECIMEN
Discharge: HOME | End: 2023-10-11

## 2023-10-12 LAB
C TRACH DNA SPEC QL PROBE+SIG AMP: NEGATIVE
CANDIDA SPECIES: NEGATIVE
GARDNERELLA VAGINALIS: POSITIVE
N GONORRHOEA DNA SPEC QL PROBE+SIG AMP: NEGATIVE
SOURCE: ABNORMAL
SPECIMEN DESCRIPTION: NORMAL
TRICHOMONAS: NEGATIVE

## 2023-10-13 LAB
HPV I/H RISK 4 DNA CVX QL NAA+PROBE: NOT DETECTED
HPV SAMPLE: NORMAL
HPV, INTERPRETATION: NORMAL
HPV16 DNA CVX QL NAA+PROBE: NOT DETECTED
HPV18 DNA CVX QL NAA+PROBE: NOT DETECTED
SPECIMEN DESCRIPTION: NORMAL

## 2023-10-14 LAB — CYTOLOGY REPORT: NORMAL
